# Patient Record
(demographics unavailable — no encounter records)

---

## 2024-10-29 NOTE — CONSULT LETTER
[Dear  ___] : Dear  [unfilled], [Consult Letter:] : I had the pleasure of evaluating your patient, [unfilled]. [Please see my note below.] : Please see my note below. [Consult Closing:] : Thank you very much for allowing me to participate in the care of this patient.  If you have any questions, please do not hesitate to contact me. [Sincerely,] : Sincerely, [FreeTextEntry2] : Jalil Canela MD [FreeTextEntry3] : Richard B. Libman, MD, FRCPC  , Neurology  Co-Director, Stroke Center Professor of Neurology [facility] [employer] at [facility]

## 2024-10-29 NOTE — PHYSICAL EXAM
[General Appearance - Alert] : alert [General Appearance - In No Acute Distress] : in no acute distress [Oriented To Time, Place, And Person] : oriented to person, place, and time [Impaired Insight] : insight and judgment were intact [Affect] : the affect was normal [Sclera] : the sclera and conjunctiva were normal [Extraocular Movements] : extraocular movements were intact [Full Visual Field] : full visual field [Outer Ear] : the ears and nose were normal in appearance [Examination Of The Oral Cavity] : the lips and gums were normal [Neck Appearance] : the appearance of the neck was normal [Neck Cervical Mass (___cm)] : no neck mass was observed [Auscultation Breath Sounds / Voice Sounds] : lungs were clear to auscultation bilaterally [Heart Rate And Rhythm] : heart rate was normal and rhythm regular [Heart Sounds] : normal S1 and S2 [Heart Sounds Gallop] : no gallops [Murmurs] : no murmurs [Heart Sounds Pericardial Friction Rub] : no pericardial rub [Arterial Pulses Carotid] : carotid pulses were normal with no bruits [Edema] : there was no peripheral edema [Veins - Varicosity Changes] : there were no varicosital changes [No CVA Tenderness] : no ~M costovertebral angle tenderness [No Spinal Tenderness] : no spinal tenderness [Abnormal Walk] : normal gait [Nail Clubbing] : no clubbing  or cyanosis of the fingernails [Musculoskeletal - Swelling] : no joint swelling seen [Motor Tone] : muscle strength and tone were normal [Skin Color & Pigmentation] : normal skin color and pigmentation [Skin Turgor] : normal skin turgor [] : no rash [FreeTextEntry1] : Generally looked well. Mental status exam: Alert, oriented x3, speech fluent/prosodic without paraphasias; comprehension intact; memory and fund of knowledge intact. On cranial nerve exam, there was minimal flattening of the right nasolabial fold; the remainder of cranial nerves II through XII were intact. On motor exam tone was normal. There was no drift. Fine finger movements are mildly slowed on the right. Power was normal throughout. Reflexes were 1+ in the arms-slightly brisker on the right, trace at the knees-brisker on the right and 2+ at the right ankle and 1+ at the left ankle, and plantar reflexes were downgoing. There was very subtle ataxia at the right arm.  His gait was mildly unsteady and he turned in 2 steps. He had moderate to severe difficulty with tandem gait. Romberg test was negative. Sensory exam was intact to all modalities.

## 2024-10-29 NOTE — DISCUSSION/SUMMARY
[FreeTextEntry1] : 10/29/24. He has made improvements since hospitalization, and now has a very mild right ataxic hemiparesis. He is otherwise neurologically intact.   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  - He has been experiencing a sensation of coldness on his right side, which is likely a delayed sensory manifestation of the stroke and does not raise concern for a recurrent stroke. - Dopplers done today were unremarkable, and demonstrated mild ICA stenosis bilaterally. I defer to you regarding whether the thyroid nodule incidentally detected on Doppler warrants further investigation. - He has been taking Aspirin and Plavix. It has been greater than 3 weeks since his stroke; at this time, there is no role for dual antiplatelet therapy. He should discontinue Plavix and continue on Aspirin monotherapy for secondary stroke prevention. - He has an implantable loop recorder to screen for occult atrial fibrillation, as per the results of the STROKE AF study. - He should benefit from aggressive vascular risk factor control. In terms of lipids, target LDL should be less than 55. - He endorses snoring, raising concern for obstructive sleep apnea. I have requested a home sleep study. - He should continue to participate in regular physical therapy.  He can follow up in 3 months. I hope he remains free of further serious trouble.

## 2024-10-29 NOTE — HISTORY OF PRESENT ILLNESS
[FreeTextEntry1] : He is a 60 year old right handed gentleman.  HPI from [facility] 9/26/24:He has no significant PMH comes in as a code stroke for right sided numbness, weakness, gait instability.. He was asymptomatic before this time, usual state of health. As baseline he is pretty active and exercises regularly. Suddenly developed feeling of right arm and leg feeling heavy and couldn't walk at all had to sit down. Denied dizziness, lightheadedness or headache at that time. He hasn't had these symptoms before. Denied any hx of seizures or migraine. He also denies any neck pain, neck trauma or heavy weight lifting. BP around   Workup at [facility] includes: MRI Brain 9/27/24: There is an acute left corona radiata/left basal ganglia infarct.  CTA Head 9/26/24: Unremarkable. CTA Neck 9/26/24: Atherosclerotic plaque results in moderate stenosis of the left vertebral artery origin. Remainder of the left vertebral artery is without stenosis. There is minimal atherosclerotic plaque at the bilateral carotid bifurcations without stenosis. TTE 9/30/24: EF 61 % no regional wall motion abnormalities seen, agitated saline injection was negative for intracardiac shunt. LDL on 9/27/24: 154.  10/29/24. He presents to the office today with his wife. He completed rehab at [facility] and is now participating in physical therapy outpatient. He has been experiencing a sensation of coldness on the right side. He is independent in all ADLs, MRS=2. He denies any new focal neurologic symptoms.  Carotid Doppler 10/29/24: Mild right ICA stenosis, <40%. Mild left ICA stenosis, approximately 40%. Incidental finding: right thyroid nodule: 0.6 x 0.5cm. TCD 10/29/24: Normal.   PCP Dr. Jalil Canela ([place])